# Patient Record
Sex: FEMALE | Race: BLACK OR AFRICAN AMERICAN
[De-identification: names, ages, dates, MRNs, and addresses within clinical notes are randomized per-mention and may not be internally consistent; named-entity substitution may affect disease eponyms.]

---

## 2019-07-11 ENCOUNTER — HOSPITAL ENCOUNTER (OUTPATIENT)
Dept: HOSPITAL 61 - KCIC US | Age: 24
Discharge: HOME | End: 2019-07-11
Attending: OBSTETRICS & GYNECOLOGY
Payer: COMMERCIAL

## 2019-07-11 DIAGNOSIS — R10.2: Primary | ICD-10-CM

## 2019-07-11 PROCEDURE — 76830 TRANSVAGINAL US NON-OB: CPT

## 2019-07-11 PROCEDURE — 76856 US EXAM PELVIC COMPLETE: CPT

## 2019-07-11 NOTE — [facility]
PELVIS W/TV

 

History: Pelvic pain, history of ovarian cyst

 

Comparison: None.

 

Findings:

Multiple transabdominal sonographic images of the pelvis are submitted. 

Pelvic structures are poorly delineated.

 

Transvaginal ultrasound: Multiple transvaginal sonographic images of the 

pelvis are submitted. There is no free fluid. Left ovary is suboptimally 

visualized due to bowel gas, estimated about 2.5 x 1.3 x 2.4 cm with 

normal color flow and low resistance vascularity in this region. Right 

ovary measured 3.3 x 2.1 x 2 cm with normal low resistance vascularity. 

Uterus measured 6.9 x 2.8 x 3.3 cm. Endometrium is within normal limits at

0.4 cm.

 

Impression: 

 

1.  No significant abnormality is demonstrated. Left ovary is suboptimally

visualized due to bowel gas.

 

Electronically signed by: Vincent Deluna MD (7/11/2019 4:55 PM) 

[id number]